# Patient Record
Sex: MALE | Race: WHITE | NOT HISPANIC OR LATINO | URBAN - METROPOLITAN AREA
[De-identification: names, ages, dates, MRNs, and addresses within clinical notes are randomized per-mention and may not be internally consistent; named-entity substitution may affect disease eponyms.]

---

## 2017-06-05 ENCOUNTER — OFFICE VISIT - RIVER FALLS (OUTPATIENT)
Dept: FAMILY MEDICINE | Facility: CLINIC | Age: 22
End: 2017-06-05

## 2017-06-05 ASSESSMENT — MIFFLIN-ST. JEOR: SCORE: 1815.18

## 2018-06-06 ENCOUNTER — OFFICE VISIT - RIVER FALLS (OUTPATIENT)
Dept: FAMILY MEDICINE | Facility: CLINIC | Age: 23
End: 2018-06-06

## 2019-01-17 ENCOUNTER — OFFICE VISIT - RIVER FALLS (OUTPATIENT)
Dept: FAMILY MEDICINE | Facility: CLINIC | Age: 24
End: 2019-01-17

## 2019-01-17 ASSESSMENT — MIFFLIN-ST. JEOR: SCORE: 1833.32

## 2019-01-18 LAB — B BURGDOR IGG+IGM SER QL: <0.9

## 2022-02-11 VITALS
TEMPERATURE: 97.8 F | HEART RATE: 68 BPM | HEIGHT: 71 IN | WEIGHT: 187.2 LBS | DIASTOLIC BLOOD PRESSURE: 72 MMHG | SYSTOLIC BLOOD PRESSURE: 124 MMHG | BODY MASS INDEX: 26.21 KG/M2

## 2022-02-11 VITALS
BODY MASS INDEX: 25.65 KG/M2 | TEMPERATURE: 99 F | HEART RATE: 72 BPM | WEIGHT: 183.2 LBS | SYSTOLIC BLOOD PRESSURE: 116 MMHG | DIASTOLIC BLOOD PRESSURE: 62 MMHG | HEIGHT: 71 IN

## 2022-02-12 VITALS
DIASTOLIC BLOOD PRESSURE: 64 MMHG | WEIGHT: 187 LBS | HEART RATE: 78 BPM | TEMPERATURE: 97.9 F | SYSTOLIC BLOOD PRESSURE: 110 MMHG | BODY MASS INDEX: 26.45 KG/M2

## 2022-02-16 NOTE — NURSING NOTE
Comprehensive Intake Entered On:  1/17/2019 2:59 PM CST    Performed On:  1/17/2019 2:55 PM CST by Belen Vizcarra CMA               Summary   Chief Complaint :   Bulls eye like spot on upper right arm   Menstrual Status :   N/A   Weight Measured :   187.2 lb(Converted to: 187 lb 3 oz, 84.91 kg)    Height Measured :   70.5 in(Converted to: 5 ft 10 in, 179.07 cm)    Body Mass Index :   26.48 kg/m2 (HI)    Body Surface Area :   2.05 m2   Systolic Blood Pressure :   124 mmHg   Diastolic Blood Pressure :   72 mmHg   Mean Arterial Pressure :   89 mmHg   Peripheral Pulse Rate :   68 bpm   BP Site :   Left arm   Pulse Site :   Radial artery   BP Method :   Manual   HR Method :   Manual   Temperature Tympanic :   97.8 DegF(Converted to: 36.6 DegC)  (LOW)    Belen Vizcarra CMA - 1/17/2019 2:55 PM CST   Health Status   Allergies Verified? :   Yes   Medication History Verified? :   Yes   Medical History Verified? :   Yes   Pre-Visit Planning Status :   Completed   Tobacco Use? :   Never smoker   Belen Vizcarra CMA - 1/17/2019 2:55 PM CST   Consents   Consent for Immunization Exchange :   Consent Granted   Consent for Immunizations to Providers :   Consent Granted   Belen Vizcarra CMA - 1/17/2019 2:55 PM CST   Meds / Allergies   (As Of: 1/17/2019 2:59:56 PM CST)   Allergies (Active)   No Known Medication Allergies  Estimated Onset Date:   Unspecified ; Created By:   Belen Vizcarra CMA; Reaction Status:   Active ; Category:   Drug ; Substance:   No Known Medication Allergies ; Type:   Allergy ; Updated By:   Belen Vizcarra CMA; Reviewed Date:   1/17/2019 2:58 PM CST        Medication List   (As Of: 1/17/2019 2:59:56 PM CST)   No Known Home Medications     Belen Vizcarra CMA - 1/17/2019 2:58:13 PM

## 2022-02-16 NOTE — PROGRESS NOTES
Patient:   ELVER MOHAN            MRN: [id number]            FIN: [id number]               Age:   23 years     Sex:  Male     :  1995   Associated Diagnoses:   Target rash   Author:   Noemy Penn MD      Chief Complaint   2019 2:55 PM CST    Bulls eye like spot on upper right arm        History of Present Illness   patient with target rash on right upper arm  first was red pimple on arm then developed discolored ring around it that is a purple blue ring  slightly raised in the middle   no purulent drainage  no fevers, no other rash, otherwise feels well      Health Status   Allergies:    Allergic Reactions (All)  No Known Medication Allergies  Canceled/Inactive Reactions (All)  No known allergies   Medications:  (Selected)   Prescriptions  Prescribed  doxycycline monohydrate 100 mg oral capsule: = 1 cap(s) ( 100 mg ), PO, bid, # 28 cap(s), 0 Refill(s), Type: Maintenance,    Medications          *denotes recorded medication          doxycycline monohydrate 100 mg oral capsule: 100 mg, 1 cap(s), PO, bid, for 14 day(s), 28 cap(s), 0 Refill(s).     Problem list:    All Problems  Hearing Loss / ICD-9-.9 / Confirmed      Histories   Past Medical History:    Active  Hearing Loss (ICD-9-.9)   Family History:    Hearing loss  Mother (Candice)        Physical Examination   Vital Signs   2019 2:55 PM CST Temperature Tympanic 97.8 DegF  LOW    Peripheral Pulse Rate 68 bpm    Pulse Site Radial artery    HR Method Manual    Systolic Blood Pressure 124 mmHg    Diastolic Blood Pressure 72 mmHg    Mean Arterial Pressure 89 mmHg    BP Site Left arm    BP Method Manual      Measurements from flowsheet : Measurements   2019 2:55 PM CST Height Measured - Standard 70.5 in    Weight Measured - Standard 187.2 lb    BSA 2.05 m2    Body Mass Index 26.48 kg/m2  HI      General:  Alert and oriented, No acute distress.    2cm ring with central clearing surrounding excoriated lesion about 3mm diameter       Impression and Plan   Diagnosis     Target rash ([id number] R21).     Plan:  will check Lyme and cover with doxycycline. Will contact patient with results..

## 2022-02-16 NOTE — PROGRESS NOTES
Patient:   ELVER MOHAN            MRN: [id number]            FIN: [id number]               Age:   22 years     Sex:  Male     :  1995   Associated Diagnoses:   Left hand pain   Author:   Dontae MUNIZ, Isaias      Report Summary   Diagnosis  Left hand pain ([id number] M79.642).  Patient Instructions   Visit Information   Visit type:  General concerns.    Accompanied by:  No one.    Source of history:  Self.    Referral source:  Self.    History limitation:  None.       Chief Complaint   2017 4:10 PM CDT     Swung at a baseball x 3 weeks, L hand went numb        History of Present Illness             The patient presents with upper extremity pain.  The location of pain is the left, wrist(s) and hand(s).  The pain is characterized by aching.  The severity of the pain is moderate.  The timing/course of the pain is constant.  The pain occurred 3 week(s).  Check swing in baseball game 3 weeks ago. Had numbness in dorsum of left hand over base of third metacarpal. Pain persists with swinging bat. Mild to no pain at rest. CC above noted and confirmed with the patient. No past history of injury to this hand..  Associated symptoms consist of numbness.        Review of Systems   Constitutional:  Negative.    Musculoskeletal:  Negative except as documented in history of present illness.    Integumentary:  Negative.    Neurologic:  Negative except as documented in history of present illness.       Health Status   Allergies:    Allergic Reactions (All)  No known allergies   Problem list:    All Problems  Hearing Loss / ICD-9-.9 / Confirmed      Histories   Past Medical History:    Active  Hearing Loss (389.9)   Family History:    Hearing loss  Mother (Candice)     Procedure history:    Colonoscopy ([id number]) on 2017 at 22 Years.  Comments:  2017 2:43 PM - Deya Stapleton  Indication: Diarrhea.  Sedation: MAC  Impression: Normal.   Social History:        Alcohol Assessment: Denies Alcohol Use             Never      Tobacco Assessment: Denies Tobacco Use            Never      Substance Abuse Assessment: Denies Substance Abuse            Never      Employment and Education Assessment            Student      Home and Environment Assessment            Marital status: Single.      Exercise and Physical Activity Assessment: Regular exercise            Exercise frequency: 5-6 times/week.      Sexual Assessment            Sexually active: Yes.  Sexual orientation: Heterosexual.        Physical Examination   Vital Signs   6/5/2017 4:10 PM CDT Temperature Temporal 99 DegF    Peripheral Pulse Rate 72 bpm    Pulse Site Radial artery    HR Method Manual    Systolic Blood Pressure 116 mmHg    Diastolic Blood Pressure 62 mmHg    Mean Arterial Pressure 80 mmHg    BP Site Left arm    BP Method Manual      Measurements from flowsheet : Measurements   6/5/2017 4:10 PM CDT Height Measured - Standard 70.5 in    Weight Measured - Standard 183.2 lb    BSA 2.03 m2    Body Mass Index 25.91 kg/m2      General:  Alert and oriented.    Cardiovascular:       Arterial pulses: Left, Radial, 2+.    Musculoskeletal:  Normal range of motion, Normal strength, No deformity, Tender at base of third metacarpal on the left..    Integumentary:  No rash.    Neurologic:  No focal deficits.       Review / Management   Radiology results   Will wait for read.      Impression and Plan   Diagnosis     Left hand pain ([id number] M79.642).     Patient Instructions:       Counseled: Patient, Regarding diagnosis, Regarding medications, Activity, Verbalized understanding.    Will place in splint and have him ice and use Advil. No baseball until after I call him with radiology report.

## 2022-02-16 NOTE — LETTER
(Inserted Image. Unable to display)   April 18, 2019      ELVER MOHAN   [address] [place], [place] [id number]        Dear ELVER,      Thank you for selecting [facility] (previously [facility]) for your healthcare needs.     Our records indicate you are due for the following services:     Annual Physical    To schedule an appointment or if you have further questions, please contact your primary clinic:   [facility]          [phone]   [facility]    [phone]             [facility]         [phone]      Powered by [employer]    Sincerely,    Noemy Penn M.D.

## 2022-02-16 NOTE — PROGRESS NOTES
Patient:   PAULO MOHAN            MRN: [id number]            FIN: [id number]               Age:   23 years     Sex:  Male     :  1995   Associated Diagnoses:   Sore throat   Author:   Isaias Diggs PA-C      Visit Information      Date of Service: 2018 09:20 am  Performing Location: [facility]  Encounter#: [id number]      Primary Care Provider (PCP):  Isaias Diggs PA-C    NPI# [id number]      Referring Provider:  Isaias Diggs PA-C    NPI# [id number]      Chief Complaint   2018 9:31 AM CDT     Pt in for sore throat x 24hrs      History of Present Illness   Paulo presents to clinic today for evaluation of sore throat.  Patient has noticed sore throat for the past 24 hours.  Denies fevers, chills, ear pain, nasal congestion, or headache.  His brother was recently treated for strep throat so patient desires to be checked today for reassurance that he is not ill.  CC above noted and confirmed with the patient.      Review of Systems   Constitutional:  Negative.    Eye:  Negative.    Ear/Nose/Mouth/Throat:  Sore throat.    Respiratory:  Negative.    Cardiovascular:  Negative.    Gastrointestinal:  Negative.    Genitourinary:  Negative.    Endocrine:  Negative.    Musculoskeletal:  Negative.       Health Status   Allergies:    Allergic Reactions (Selected)  No known allergies   Medications:    Medications          No Known Home Medications recorded for this encounter     Problem list:    All Problems  Hearing Loss / ICD-9-.9 / Confirmed      Histories   Past Medical History:    Active  Hearing Loss (389.9)   Family History:    Hearing loss  Mother (Candice)     Procedure history:    Colonoscopy ([id number]) on 2017 at 22 Years.  Comments:  2017 2:43 PM - Deya Stapleton  Indication: Diarrhea.  Sedation: MAC  Impression: Normal.   Social History:        Alcohol Assessment: Denies Alcohol Use            Never      Tobacco Assessment: Denies Tobacco Use            Never       Substance Abuse Assessment: Denies Substance Abuse            Never      Employment and Education Assessment            Student      Home and Environment Assessment            Marital status: Single.      Exercise and Physical Activity Assessment: Regular exercise            Exercise frequency: 5-6 times/week.      Sexual Assessment            Sexually active: Yes.  Sexual orientation: Heterosexual.        Physical Examination   Vital Signs   6/6/2018 9:31 AM CDT Temperature Tympanic 97.9 DegF    Peripheral Pulse Rate 78 bpm    Pulse Site Radial artery    HR Method Manual    Systolic Blood Pressure 110 mmHg    Diastolic Blood Pressure 64 mmHg    Mean Arterial Pressure 79 mmHg    BP Site Left arm    BP Method Manual      Measurements from flowsheet : Measurements   6/6/2018 9:31 AM CDT     Weight Measured - Standard                187 lb     General:  Alert and oriented, No acute distress.    Eye:  Pupils are equal, round and reactive to light, Extraocular movements are intact, Normal conjunctiva.    HENT:  Tympanic membranes are clear, Oral mucosa is moist, No pharyngeal erythema.    Neck:  Supple, Non-tender, No lymphadenopathy.    Respiratory:  Lungs are clear to auscultation, Respirations are non-labored, Breath sounds are equal.    Cardiovascular:  Normal rate, Regular rhythm.    Musculoskeletal:  Normal range of motion, No tenderness, Normal gait.       Review / Management   Results review:  Lab results   6/6/2018 10:00 AM CDT Rapid Strep POC Negative    POC Test Comments S/O for confirmation    POC Test Comments POC Test Comments   .       Impression and Plan   Diagnosis     Sore throat (SND81-BK J02.9).     Plan:  Rapid strep negative, will send for culture.  If results of culture positive, patient will be called and medication prescribed.  Educated patient that sore throat is likely viral and recommend rest and fluids; should feel better within a week.  If sore throat persists or worsenes, recommend return  to clinic.  .    Note composed by TARA Zavala. History confirmed and exam performed by Isaias Diggs PA-C.

## 2022-02-16 NOTE — TELEPHONE ENCOUNTER
---------------------  From: Noemy Penn MD   To: PAULO MOHAN    Sent: 1/21/2019 8:27:35 AM CST    Paulo,  Your Lyme test was negative. Please let me know if the rash isn't improving. I would recommend you finish the course of antibiotics.  Thanks,  Daisy Penn    Results:  Date Result Name Value   1/17/2019 3:12 PM Lyme Ab IgG/IgM WB <0.90